# Patient Record
Sex: FEMALE
[De-identification: names, ages, dates, MRNs, and addresses within clinical notes are randomized per-mention and may not be internally consistent; named-entity substitution may affect disease eponyms.]

---

## 2020-05-13 ENCOUNTER — HOSPITAL ENCOUNTER (OUTPATIENT)
Dept: HOSPITAL 92 - L&D/OP | Age: 20
Discharge: HOME HEALTH | End: 2020-05-13
Payer: COMMERCIAL

## 2020-05-13 VITALS — DIASTOLIC BLOOD PRESSURE: 57 MMHG | TEMPERATURE: 99.9 F | SYSTOLIC BLOOD PRESSURE: 114 MMHG

## 2020-05-13 VITALS — BODY MASS INDEX: 29.5 KG/M2

## 2020-05-13 DIAGNOSIS — R10.30: ICD-10-CM

## 2020-05-13 DIAGNOSIS — Z3A.30: ICD-10-CM

## 2020-05-13 DIAGNOSIS — O26.899: Primary | ICD-10-CM

## 2020-05-13 LAB — WBC UR QL AUTO: (no result) HPF (ref 0–3)

## 2020-05-13 PROCEDURE — 51701 INSERT BLADDER CATHETER: CPT

## 2020-05-13 PROCEDURE — 99283 EMERGENCY DEPT VISIT LOW MDM: CPT

## 2020-05-13 PROCEDURE — 81001 URINALYSIS AUTO W/SCOPE: CPT

## 2020-05-13 PROCEDURE — 76815 OB US LIMITED FETUS(S): CPT

## 2020-05-13 PROCEDURE — 36415 COLL VENOUS BLD VENIPUNCTURE: CPT

## 2020-05-13 NOTE — PDOC.EVN
Event Note





- Event Note


Event Note: 





OBGYN Faculty





Time: 1830





HPI: 18 yo G1 at 30 weeks 2 days here for "abd pain" bilateral lower abdomen 

and worse over few days, worse with movement. No VB, no LOF. Good FM, no trauma.





No prenatal issues





Past med Hx: negative





Surgeries: negative





Allergies: none





PHYSICAL 114/57 85 18 96% 99.9


Cat I (reactive) NST


Abdomen NT


Ut soft


CX not checked





NST reactive





A/P: 30 week possible round lig pain by exam and HX. No evidence PTL.


1. Check CX or check cervical length by sono to confirm no issues there


2. Check UA (cath)


3. NST reactive





OK for outpatient care once UA back and CX determined to be normal

## 2020-05-13 NOTE — PDOC.FPROB
FMR OB H&P: HPI





- History of Present Illness


Chief Complaint: abd pain


Indentification: 20 y/o  @ 30.2 WGA


History of Present Illness: 


presents for abdominal pain that is in her lower abdomen on both sides. It has 

been going on for several days. It comes and goes, but is not regular. Worse 

with some movements. Denies dysuria, vaginal bleeding, ctx, LOF. Endorses good 

fetal movement. 


Primary Care Physician: 


Dr. Samuel - [facility]





FMR OB H&P: Current Pregnancy





- Prenatal Care


: 1


Para: 0


Gestational age: 30.2





- OB Labs


Blood type: O


RH: positive


Antibody Screen: negative


HIV: negative


RPR: negative


HepBsAg: negative


Rubella: immune


GBS: unknown





FMR OB H&P: History





- Past Medical History


PMH: 


Denies





- OB History


OB History: 


G1





- GYN History


GYN History: 


Denies





- Surgical History


Sx History: 


Denies





- Social History


Social History: 


Denies tobacco, ETOH, drug use





- Family History


Family History: 


Denies





FMR OB H&P: Medications





- Current


Home Medications: 


 











 Medication  Instructions  Recorded  Confirmed  Type


 


Prenatal Vitamin 1 tablet PO DAILY 20 History











Allergies/Adverse Reactions: 


 Allergies











Allergy/AdvReac Type Severity Reaction Status Date / Time


 


No Known Allergies Allergy   Unverified 20 17:51














FMR OB H&P: ROS





- Review of Systems


General: denies: fever/chills, weight/appetite/sleep changes


Eyes: denies: vision changes, double vision


ENT: denies: nasal congestion, sore throat


Cardiovascular: denies: chest pain, edema


Respiratory: denies: cough, shortness of breath


Gastrointestinal: reports: abdominal pain.  denies: nausea, vomiting, diarrhea


Genitourinary (Female): denies: dysuria, vaginal discharge, vaginal pain, 

vaginal bleeding, contractions


Musculoskeletal: denies: pain, swelling


Neurologic: denies: numbness, weakness


Integumentary: denies: itching, rash


Hematologic/Lymphatic: denies: prolonged or excessive bleeding, enlarged lymph 

nodes


Psychological: denies: depression, anxiety





FMR OB H&P: Vital Signs





- Maternal


Vital signs: 


 Vital Signs - First Documented











Temp Pulse Resp BP


 


 99.9 F H  85   18   114/57 L


 


 20 17:25  20 17:25  20 17:25  20 17:25














- Fetal Heart Tones


Baseline: 150


Variability: moderate


Acceleration: present


Deceleration: absent


Category: category 1


[place] contractions every: none





FMR OB H&P: Physical Exam





- Physical Exam


General: NAD, awake, alert and oriented


HEENT: MMM, conjunctiva clear, grossly normal vision, normal nasal mucosa


Neck: supple, no LAD


Heart: RRR, normal S1/S2, no murmurs/rubs/gallops, pulses present, no edema


General: CTAB, no respiratory distress, good air movement, no rales/rhonchi, no 

wheezing


Abdomen: soft, gravid, non-tender


Musculoskeletal: normal gait and station, pulses present, FROM in all four 

extremities


Neurological: no focal deficit


Skin: good tugor, capillary refill <2 seconds


Lymphatic: no unusual bruising or bleeding, no purpura


Psychiatric: intact recent and remote memory, good judgement and insight





- Pelvic Exam


SVE: closed/60/-1/midposition/moderate





FMR OB H&P: A/P





- Problem List


(1) Abdominal pain affecting pregnancy


Current Visit: Yes   Status: Acute   Code(s): O26.899 - OTH PREGNANCY RELATED 

CONDITIONS, UNSPECIFIED TRIMESTER; R10.9 - UNSPECIFIED ABDOMINAL PAIN   


Disposition: 


1. Abdominal pain in pregnancy


-No ctx or signs/sx PTL


-Will check straight cath UA


-Cervical check may have shown some shortening. Will confirm with TV US for 

cervical length


Dispo pending cervical length and UA results


Discussion: 


Date/Time: 20











This H&P was discussed with Dr. Huynh who agrees with the above documentation 

and plan.





Signature: 


Fiordaliza Cash MD, PGY-3

## 2020-05-13 NOTE — ULT
LIMITED OBSTETRICAL ULTRASOUND:

5/13/20

 

INDICATION:

Evaluate cervical length and premature contractions.

 

FINDINGS: 

There is a single live intrauterine gestation in vertex presentation. Cardiac activity is noted at 14
7 beats per minute.  Placenta is anterior and to the right aspect of midline. Cervical length was 3.6
 cm without evidence of funneling. 

 

IMPRESSION:

1.      Single live intrauterine gestation.

2.      Cervical length is 3.6 cm without evidence of funneling.

1.      

 

POS: BH

## 2020-05-13 NOTE — PDOC.BPN
- Brief Progress Note


Cervical length 3.6 cm


Reactive NST


UA no signs of infection. 





Suspect round ligament pain. 


d/c home with routine f/u at prenatal clinic


Return precautions discussed.

## 2020-07-08 ENCOUNTER — HOSPITAL ENCOUNTER (OUTPATIENT)
Dept: HOSPITAL 92 - L&D/OP | Age: 20
Discharge: HOME | End: 2020-07-08
Attending: OBSTETRICS & GYNECOLOGY
Payer: COMMERCIAL

## 2020-07-08 VITALS — SYSTOLIC BLOOD PRESSURE: 127 MMHG | DIASTOLIC BLOOD PRESSURE: 84 MMHG | TEMPERATURE: 98.7 F

## 2020-07-08 VITALS — BODY MASS INDEX: 30.7 KG/M2

## 2020-07-08 DIAGNOSIS — Z3A.38: ICD-10-CM

## 2020-07-08 DIAGNOSIS — R10.9: ICD-10-CM

## 2020-07-08 DIAGNOSIS — O99.89: Primary | ICD-10-CM

## 2020-07-08 DIAGNOSIS — O23.593: ICD-10-CM

## 2020-07-08 DIAGNOSIS — B96.89: ICD-10-CM

## 2020-07-08 PROCEDURE — 87510 GARDNER VAG DNA DIR PROBE: CPT

## 2020-07-08 PROCEDURE — 87660 TRICHOMONAS VAGIN DIR PROBE: CPT

## 2020-07-08 PROCEDURE — 87480 CANDIDA DNA DIR PROBE: CPT

## 2020-07-08 NOTE — PDOC.OBLPN
FMR OB Labor PN: Obj





- Maternal


Vital signs: 


BP: []  HR: [] RR: [] Tmax: [] Pox: []% on []  Wt: []   








FMR OB Labor PN: A/P


Discussion: 


Date/Time: 07/08/20 0499











This H&P was discussed with  [] and  [] who agree with the above 

documentation and plan.

## 2020-07-08 NOTE — PDOC.FPROB
FMR OB H&P: HPI





- History of Present Illness


Chief Complaint: cramping


Indentification: 20 yo G1 at 38.2 wks 


History of Present Illness: 





20 yo  at 38.2 wks here for cramping. Every 3-5 min. Reports increased VD 

that is white and different from her normal discharge. Also reports fluid 

leaking, minimal amount, not continous. Denies VB. Endorses FM. Reports no 

problems this pregnancy. No records currently in our files.  


Primary Care Physician: 





Laney Samuel 





FMR OB H&P: Current Pregnancy





- Prenatal Care


: 1


Para: 0


Gestational age: 38.2


Due date: 20





- OB Labs


Blood type: unknown


RH: unknown


Antibody Screen: unknown


HIV: unknown


RPR: unknown


HepBsAg: unknown


Quad screen: unknown


Urine drug screen: not done


Gonorrhea: unknown


Chlamydia: unknown


GBS: negative (per patient)





- Anatomy Survey


Anatomy survey: 





No records 





FMR OB H&P: History





- Past Medical History


PMH: 





Denies





- OB History


OB History: 





First pregnancy, reports no issues 





- GYN History


GYN History: 





Denies h/o STIs


Pap not indicated d/t age





- Surgical History


Sx History: 





Denies 





- Social History


Social History: 





Denies TAD





- Family History


Family History: 





Non contributory





FMR OB H&P: Medications





- Current


Home Medications: 


 











 Medication  Instructions  Recorded  Confirmed  Type


 


Prenatal Vitamin 1 tablet PO DAILY 20 History











Allergies/Adverse Reactions: 


 Allergies











Allergy/AdvReac Type Severity Reaction Status Date / Time


 


No Known Allergies Allergy   Verified 20 16:54














FMR OB H&P: ROS





- Review of Systems


General: denies: fever/chills, weight/appetite/sleep changes


Eyes: denies: vision changes, scotomas


ENT: denies: nasal congestion, rhinorrhea, sore throat


Cardiovascular: reports: edema (trace).  denies: chest pain


Gastrointestinal: denies: abdominal pain, cramping, nausea, vomiting


Genitourinary (Female): reports: vaginal discharge, contractions.  denies: 

dysuria, vaginal pain, vaginal bleeding, vaginal pressure


Musculoskeletal: denies: pain, tenderness


Neurologic: denies: syncope, seizures


Integumentary: denies: itching, rash, lesions


Endocrine: denies: polydipsia, polyuria





FMR OB H&P: Vital Signs





- Maternal


Vital signs: 


 Vital Signs - First Documented











Temp Pulse Resp BP Pulse Ox


 


 98.7 F   101 H  18   127/84   97 


 


 20 16:54  20 16:54  20 16:54  20 16:54  20 16:54














- Fetal Heart Tones


Baseline: 140


Variability: moderate (minimal for 1-2 minutes which improved to moderate after 

PO juice)


Acceleration: present


Deceleration: absent


Category: category 1





FMR OB H&P: Physical Exam





- Physical Exam


General: NAD, awake, alert and oriented


HEENT: normocephalic and atraumatic, EOMI, MMM, conjunctiva clear


Neck: supple, FROM, trachea midline


Heart: pulses present


General: no respiratory distress, good air movement


Abdomen: gravid, non-tender


Musculoskeletal: FROM in all four extremities


Skin: no rash, good tugor, capillary refill <2 seconds


Lymphatic: no unusual bruising or bleeding, no purpura


Psychiatric: intact recent and remote memory, good judgement and insight





- Pelvic Exam


Vulva: normal hair distribution


Deviation from normal: white-green frothy discharge, no fluid with cough


SVE: /-2





FMR OB H&P: A/P





- Problem List


(1) Pregnancy with 38 completed weeks gestation


Current Visit: Yes   Status: Acute   Code(s): Z3A.38 - 38 WEEKS GESTATION OF 

PREGNANCY   


Discussion: 


Date/Time: 20 8728








1. Term sIUP, labor r/o


-/2, reported checked in office yesterday and was FT


-CTX q4min on monitor, will PO hydrate


-FHT: Cat I-II, minimal for 2-3 min, given juice now Cat I, continue monitoring


-frothy discharge present- VP3 taken


-Pt reports GBS neg, if admitted will need to call and verify


-Recheck in 2 hours 














This H&P was discussed with Dr. Jones who agree with the above documentation 

and plan.

## 2020-07-09 ENCOUNTER — HOSPITAL ENCOUNTER (INPATIENT)
Dept: HOSPITAL 92 - L&D/OP | Age: 20
LOS: 2 days | Discharge: HOME | End: 2020-07-11
Attending: OBSTETRICS & GYNECOLOGY | Admitting: OBSTETRICS & GYNECOLOGY
Payer: COMMERCIAL

## 2020-07-09 VITALS — BODY MASS INDEX: 29.2 KG/M2

## 2020-07-09 DIAGNOSIS — Z3A.38: ICD-10-CM

## 2020-07-09 LAB
HBSAG INDEX: 0.13 S/CO (ref 0–0.99)
HGB BLD-MCNC: 11.7 G/DL (ref 12–16)
MCH RBC QN AUTO: 25.8 PG (ref 25–35)
MCV RBC AUTO: 78.7 FL (ref 78–98)
PLATELET # BLD AUTO: 139 THOU/UL (ref 130–400)
RBC # BLD AUTO: 4.52 MILL/UL (ref 4–5.2)
SYPHILIS ANTIBODY INDEX: 0.05 S/CO
WBC # BLD AUTO: 14.4 THOU/UL (ref 4.8–10.8)

## 2020-07-09 PROCEDURE — 86900 BLOOD TYPING SEROLOGIC ABO: CPT

## 2020-07-09 PROCEDURE — 86850 RBC ANTIBODY SCREEN: CPT

## 2020-07-09 PROCEDURE — 51702 INSERT TEMP BLADDER CATH: CPT

## 2020-07-09 PROCEDURE — 87510 GARDNER VAG DNA DIR PROBE: CPT

## 2020-07-09 PROCEDURE — 99285 EMERGENCY DEPT VISIT HI MDM: CPT

## 2020-07-09 PROCEDURE — 85027 COMPLETE CBC AUTOMATED: CPT

## 2020-07-09 PROCEDURE — 87660 TRICHOMONAS VAGIN DIR PROBE: CPT

## 2020-07-09 PROCEDURE — 36415 COLL VENOUS BLD VENIPUNCTURE: CPT

## 2020-07-09 PROCEDURE — 86780 TREPONEMA PALLIDUM: CPT

## 2020-07-09 PROCEDURE — 10907ZC DRAINAGE OF AMNIOTIC FLUID, THERAPEUTIC FROM PRODUCTS OF CONCEPTION, VIA NATURAL OR ARTIFICIAL OPENING: ICD-10-PCS | Performed by: OBSTETRICS & GYNECOLOGY

## 2020-07-09 PROCEDURE — 86901 BLOOD TYPING SEROLOGIC RH(D): CPT

## 2020-07-09 PROCEDURE — 87480 CANDIDA DNA DIR PROBE: CPT

## 2020-07-09 PROCEDURE — 87340 HEPATITIS B SURFACE AG IA: CPT

## 2020-07-09 RX ADMIN — DOCUSATE CALCIUM SCH: 240 CAPSULE, LIQUID FILLED ORAL at 21:38

## 2020-07-09 NOTE — PDOC.OPDEL
OB Operative/Delivery Note


Delivery Dr/Surgeon: Michael/Quoc/Keyshawn


Pre-Delivery Diagnosis: active labor


Procedure/Post Delivery Dx: spontaneous vaginal delivery


Weeks gestation: 38 (38.3)


Anesthesia: epidural





- Additional Findings/Plan


Placenta delivered: spontaneous


Repaired Obstetrical Laceration: none


Estimated blood loss: 200


Compilations/Other Findings: 





Delivering Physician: Dr. Rodriguez and Dr. Kumari


Attending Dr. Mahajan


Procedure: Spontaneous Vaginal Delivery


Anesthesia: epidural


EBL: 200 ml


Pre-op Diagnosis:


1. Term intrauterine pregnancy in labor


2. Active BV





Post-op Diagnosis:


1. Term intrauterine pregnancy, delivered


2. same as above


Indications: A 18y/o female  presents in active labor 


Delivery Note: This is 18yo F  -> 1 @ 38.3wks who delivered a viable F


infant at 1236 on 20. Following an uneventful antepartum course, a vigorous 

F


was delivered over an intact perineum in the occipitoanterior position.


Anterior Shoulder and then remainder of the body delivered. No nuchal cord.


The head was held down and mouth and nares were bulb suctioned. After delayed 

cord clamping, cord


clamped and cut and cord blood collected. Placenta delivered intact with a 3


vessel cord noted. Fundal massage was performed and the fundus was firm. The


cervix and vagina were inspected and found to be free of lacerations, with a 

periurethral abrasion that was hemostatic. Infant went to  nursery in 

good condition for routine care.


Apgars were 9/9 at 1 & 5 minutes, respectively. Patient tolerated delivery


well and went to postpartum after routine recovery/care.


Post delivery plan: routine recovery

## 2020-07-09 NOTE — PDOC.BPN
- Brief Progress Note





Patient was unchanged after 2 hours.  VP3 + for BV.  Given Rx by residents.  D/

c home with precautions.

## 2020-07-09 NOTE — PDOC.FPROB
FMR OB H&P: HPI





- History of Present Illness


Chief Complaint: Increased contractions


History of Present Illness: 





Patient is a 19F  who reports that since her visit last night, she has had 

passage of a few blood clots with increased contractions. She endorse +FM, +VB, 

+vaginal discharge. She has not noticed a gush of fluid or leakage of clear 

fluid. She reports no problems this pregnancy.


Primary Care Physician: 





Laney Samuel





FMR OB H&P: Current Pregnancy





- Prenatal Care


: 1


Para: 0


Gestational age: 38.3


Due date: 20





FMR OB H&P: History





- Past Medical History


PMH: 





noncontributory





- OB History


OB History: 





First pregnancy, denies h/o STI, no pap 2/2 age





- Surgical History


Sx History: 





none





- Social History


Social History: 





Denies TAD





- Family History


Family History: 





Cousin with 2 children with trisomy 21





FMR OB H&P: Medications





- Current


Home Medications: 


 











 Medication  Instructions  Recorded  Confirmed  Type


 


Prenatal Vitamin 1 tablet PO DAILY 20 History


 


Metronidazole [metroNIDAZOLE] 500 mg PO Q12HR #14 tab 20 Rx











Allergies/Adverse Reactions: 


 Allergies











Allergy/AdvReac Type Severity Reaction Status Date / Time


 


No Known Allergies Allergy   Verified 20 07:05














FMR OB H&P: ROS





- Review of Systems


General: denies: fever/chills, night sweats


Eyes: denies: eye pain, vision changes


ENT: denies: nasal congestion, rhinorrhea


Respiratory: denies: cough, shortness of breath


Gastrointestinal: reports: abdominal pain (Contractions), cramping.  denies: 

vomiting


Genitourinary (Female): reports: vaginal discharge, vaginal bleeding.  denies: 

incontinence, dysuria


Musculoskeletal: denies: pain, stiffness


Neurologic: denies: numbness, syncope, seizures


Integumentary: denies: itching, rash


Endocrine: denies: polydipsia, polyuria


Hematologic/Lymphatic: denies: prolonged or excessive bleeding, enlarged lymph 

nodes


Psychological: denies: depression, anxiety





FMR OB H&P: Vital Signs





- Maternal


Vital signs: 


 Vital Signs - First Documented











Temp Pulse Resp BP


 


 97.7 F   93   20   141/90 H


 


 20 06:49  20 06:49  20 06:49  20 06:49














- Fetal Heart Tones


Baseline: 140


Variability: moderate (period of ~15-20 minutes minimal variability)


Acceleration: present


Deceleration: absent


Category: category 1


[place] contractions every: 2-4 mins





FMR OB H&P: Physical Exam





- Physical Exam


General: awake, alert and oriented


HEENT: normocephalic and atraumatic, grossly normal vision


Neck: supple, FROM


Heart: pulses present


General: no respiratory distress, good air movement


Abdomen: gravid


Neurological: no focal deficit


Skin: no rash, good tugor


Lymphatic: no unusual bruising or bleeding


Psychiatric: intact recent and remote memory, normal mood and affect





FMR OB H&P: A/P


Disposition: 





admission for labor, LOS >48 hrs


Discussion: 


Date/Time: 20 0803





1. Term sIUP labor


-1 from  on 20


-cousin with 2 children with trisomy 21-cfDNA negative


-bedside US shows vertex baby


-CTX q2-4min on monitor, will IV hydrate


-FHT: Cat I-II, minimal for 15-20 min, now Cat I, continue monitoring


-frothy discharge present- positive for BV


   -will need tx post op


-Pt IS GBS neg per lab report


-Epidrual placement


-AROM post epidural placement


-Continue monitoring, check q3-4 hrs





This H&P was discussed with Dr. Grigsby and Dr. Mahajan who agree with the above 

documentation and plan.

## 2020-07-10 RX ADMIN — DOCUSATE CALCIUM SCH MG: 240 CAPSULE, LIQUID FILLED ORAL at 09:15

## 2020-07-10 RX ADMIN — DOCUSATE CALCIUM SCH MG: 240 CAPSULE, LIQUID FILLED ORAL at 21:45

## 2020-07-10 NOTE — PDOC.PP
Post Partum Progress Note


Post Partum Day #: 1


Subjective: 





Patient reports no acute concerns, pain is minimal-just cramping, bleeding has 

decreased, she is ambulating well and has voided with no BM. She is bottle 

feeding but plans to bottle and breast feed. She has no concerns about going 

home.


PO intake tolerated: yes


Flatus: yes


Ambulation: yes


 Vital Signs (12 hours)











  Temp Pulse Resp BP Pulse Ox


 


 07/10/20 05:50  97.9 F  77  18  117/77  98


 


 07/10/20 00:27  98.7 F  87  16  112/69 


 


 20 21:35  99.1 F  80  18  106/66  98








 Weight











Weight                         72.575 kg

















- Physical Examination


General: NAD


Cardiovascular: RRR


Respiratory: clear to auscultation bilaterally, non-labored breathing


Abdominal: + bowel sounds, lochia, no distention, appropriately TTP


Neurological: no gross focal deficits


Psychiatric: A&Ox3, normal affect


Result Diagrams: 


 20 08:27





Additional Labs: 


 Post Partum Labs











Blood Type  O POSITIVE   20  08:25    


 


Hep Bs Antigen  Non-Reactive S/CO (NonReactive)   20  07:43    














- Assessment/Plan





19F  who gave birth to TAGA female @ 38.3 via  w/ no complications, pp 

day 1.





1 Term  PP Day 1


- no complications in delivery


- continue to monitor bleeding


- routine pp care


- Discussed breast feeding benefits, lactation consulted


- encouraged ambulation





Dispo: routine pp care, anticipate D/C  with

## 2020-07-11 VITALS — DIASTOLIC BLOOD PRESSURE: 73 MMHG | TEMPERATURE: 98.4 F | SYSTOLIC BLOOD PRESSURE: 138 MMHG

## 2020-07-11 RX ADMIN — DOCUSATE CALCIUM SCH MG: 240 CAPSULE, LIQUID FILLED ORAL at 10:16

## 2020-07-11 NOTE — PDOC.PP
Post Partum Progress Note


Post Partum Day #: 2


Subjective: 





Patient is doing well. No acute concerns, no acute events overnight. Bleeding 

is decreasing, she is ambulating well, and has no difficulty voiding. She is 

ready to go home.


PO intake tolerated: yes


Flatus: yes


Ambulation: yes


 Vital Signs (12 hours)











  Temp Pulse Resp BP Pulse Ox


 


 07/10/20 19:48  98.3 F  76  18  133/67  98








 Weight











Weight                         72.575 kg

















- Physical Examination


General: NAD


Cardiovascular: no m/r/g, RRR


Respiratory: clear to auscultation bilaterally, non-labored breathing


Abdominal: + bowel sounds, no distention, appropriately TTP


Fundus firm & at: below umbilicus


Neurological: no gross focal deficits


Psychiatric: A&Ox3, normal affect


Result Diagrams: 


 20 08:27





Additional Labs: 


 Post Partum Labs











Blood Type  O POSITIVE   20  08:25    


 


Hep Bs Antigen  Non-Reactive S/CO (NonReactive)   20  07:43    














- Assessment/Plan





19F  who gave birth to TAGA female @ 38.3 via  w/ no complications, pp 

day 2.





1 Term  PP Day 2


- no complications in delivery


- continue to monitor bleeding


- routine pp care


- Discussed breast feeding benefits, lactation consulted


- encouraged ambulation





Dispo: routine pp care, anticipate D/C today